# Patient Record
Sex: MALE | Race: BLACK OR AFRICAN AMERICAN | NOT HISPANIC OR LATINO | URBAN - METROPOLITAN AREA
[De-identification: names, ages, dates, MRNs, and addresses within clinical notes are randomized per-mention and may not be internally consistent; named-entity substitution may affect disease eponyms.]

---

## 2024-05-13 VITALS — HEIGHT: 69 IN | WEIGHT: 199.96 LBS

## 2024-05-13 RX ORDER — CHLORHEXIDINE GLUCONATE 213 G/1000ML
1 SOLUTION TOPICAL ONCE
Refills: 0 | Status: DISCONTINUED | OUTPATIENT
Start: 2024-05-15 | End: 2024-05-16

## 2024-05-13 NOTE — H&P ADULT - CRANIAL NERVE
Right-sided droop when smiling, able to stick tongue out, can raise eyebrows, unable to make puffy cheeks, can shrug shoulders

## 2024-05-13 NOTE — H&P ADULT - PSYCHIATRIC COMMENTS
Pt is A&O x 1-2 (knows his name, birth month but not day or year, knows today's calender date but not year and month, does not know the name of the hospital, thinks the president is Cueva).

## 2024-05-13 NOTE — H&P ADULT - NSICDXPASTMEDICALHX_GEN_ALL_CORE_FT
PAST MEDICAL HISTORY:  CVA (cerebrovascular accident)     DM (diabetes mellitus)     H/O carotid artery stenosis     Hyperlipidemia     Hypertension     PAD (peripheral artery disease)

## 2024-05-13 NOTE — H&P ADULT - NSHPLABSRESULTS_GEN_ALL_CORE
13.6   8.05  )-----------( 402      ( 15 May 2024 14:58 )             43.8       05-15    142  |  104  |  17  ----------------------------<  102<H>  4.3   |  25  |  1.17    Ca    10.0      15 May 2024 15:34  Mg     2.1     05-15    TPro  7.5  /  Alb  4.4  /  TBili  <0.2  /  DBili  x   /  AST  20  /  ALT  32  /  AlkPhos  127<H>  05-15      PT/INR - ( 15 May 2024 14:58 )   PT: 10.6 sec;   INR: 0.93          PTT - ( 15 May 2024 14:58 )  PTT:35.4 sec    CARDIAC MARKERS ( 15 May 2024 15:34 )  x     / x     / 136 U/L / x     / 4.9 ng/mL        Urinalysis Basic - ( 15 May 2024 15:34 )    Color: x / Appearance: x / SG: x / pH: x  Gluc: 102 mg/dL / Ketone: x  / Bili: x / Urobili: x   Blood: x / Protein: x / Nitrite: x   Leuk Esterase: x / RBC: x / WBC x   Sq Epi: x / Non Sq Epi: x / Bacteria: x

## 2024-05-13 NOTE — H&P ADULT - SOCIAL HISTORY: TOBACCO USE
Former smoker - quit 2023  Denies alcohol use or recreational drug use Smokes 1 cigarette per day (last occurrence 5/15/24)

## 2024-05-13 NOTE — H&P ADULT - HISTORY OF PRESENT ILLNESS
Cardiologist: Dr. Juanpablo Gibson  Escort:  Pharmacy:    62 yo M, former smoker - quit 2023. PMHx HTN, HLD, DM, severe PAD (DAVE 4/2024 per MD note with DAVE < 0.7 B/L, LE arterial US 3/2024 with severe R>L PAD - R leg with possible inflow inilac disease w/ additional SFA/pop/infrapop, L leg SFA/pop, plan to address after cardiac cath) , CVA 2016 (L MCA infarct affecting R-sided motor function,  walks with caner after stroke), carotid artery stenosis (L carotid artery occlusion,  s/p R endarterectomy)  who presented to o/p Cardiologist c/o non-radiating SSCP described as dull ache and of severe intensity with associated ACOSTA upon ambulation of >2 city blocks, alleviated with rest over past few months/years. Also with B/L thigh/calf/foot pain occurring at rest but worsened with exertion over past few months.  All other ROS negative.  MRI Brain 4/2023 per MD note:  large chronic infarct to left parietal/posterior temporal/occipital/posterior frontal lobes. NST 4/10/24:  small perfusion abnormality of mild intensity present in the anterior region on stress images which were reversible in rest images. TTE 3/14/24:  normal biventricular systolic function, inferior, inferoseptal hypokinesis, mild LVH, + LV diastolic dysfunction, trace MR/TR.  In light of pt's risk factors, CCS Anginal Class II sx, abnormal TTE/NST, patient referred for cardiac cath with possible intervention to r/o underlying CAD.  Cardiologist: Dr. Juanpablo Gibson  Escort:  Pharmacy:    64 yo M, former smoker - quit 2023. PMHx HTN, HLD, DM, severe PAD (DAVE 4/2024 per MD note with DAVE < 0.7 B/L, LE arterial US 3/2024 with severe R>L PAD - R leg with possible inflow inilac disease w/ additional SFA/pop/infrapop, L leg SFA/pop, plan to address after cardiac cath) , CVA 2016 (L MCA infarct affecting R-sided motor function,  walks with caner after stroke), carotid artery stenosis (L carotid artery occlusion,  s/p R endarterectomy)  who presented to o/p Cardiologist c/o non-radiating SSCP described as dull ache and of severe intensity with associated ACOSTA upon ambulation of >2 city blocks, alleviated with rest over past few months/years. Also with B/L thigh/calf/foot pain occurring at rest but worsened with exertion over past few months.  All other ROS negative.      MRI Brain 4/2023 per MD note:  large chronic infarct to left parietal/posterior temporal/occipital/posterior frontal lobes.     NST 4/10/24:  small perfusion abnormality of mild intensity present in the anterior region on stress images which were reversible in rest images.     TTE 3/14/24:  normal biventricular systolic function, inferior, inferoseptal hypokinesis, mild LVH, + LV diastolic dysfunction, trace MR/TR.      In light of pt's risk factors, CCS Anginal Class II sx, abnormal TTE/NST, patient referred for cardiac cath with possible intervention to r/o underlying CAD.  Cardiologist: Dr. Juanpablo Gibson  Pharmacy: [facility] ([phone])  Escort: health aide      62 yo M current smoker w/ PMHx of HTN, HLD, DMT2, severe PAD, CVA 2016 (L MCA infarct affecting R-sided motor function,  walks with cane after stroke), carotid artery stenosis (L carotid artery occlusion,  s/p R endarterectomy)  who presented to o/p Cardiologist c/o non-radiating SSCP described as dull ache and of severe intensity with associated ACOSTA upon ambulation of >2 city blocks, alleviated with rest over past few months/years. Also with B/L thigh/calf/foot pain occurring at rest but worsened with exertion over past few months.  Pt endorses back and RLE pain, RUE and RLE weakness. Denies palpitations, orthopnea, PND, syncope, dizziness, abdominal pain, fever, chills or recent sick contact.     Pt has memory deficits and slurred speech 2/2 the CVA. Pt is A&O x 1-2 (knows his name, birth month but not day or year, knows today's calender date but not year and month, does not know the name of the hospital, thinks the president is Cueva). Pt was unable to give consent due to memory deficits, wife gave verbal consent over the phone and verified medication.       DAVE 4/2024: per MD note with DAVE < 0.7 B/L, LE arterial US 3/2024 with severe R>L PAD - R leg with possible inflow inilac disease w/ additional SFA/pop/infrapop, L leg SFA/pop, plan to address after cardiac cath)    MRI Brain 4/2023 per MD note:  large chronic infarct to left parietal/posterior temporal/occipital/posterior frontal lobes.     NST 4/10/24:  small perfusion abnormality of mild intensity present in the anterior region on stress images which were reversible in rest images. LVEF 46%    TTE 3/14/24:  normal biventricular systolic function, inferior, inferoseptal hypokinesis, mild LVH, + LV diastolic dysfunction, trace MR/TR.      In light of pt's risk factors, CCS Anginal Class II sx, abnormal TTE/NST, patient referred for cardiac cath with possible intervention to r/o underlying CAD.  Cardiologist: Dr. Juanpablo Gibson  Pharmacy: [facility] ([phone])  Escort: health aide      64 yo M current smoker w/ PMHx of HTN, HLD, DMT2, severe PAD, CVA 2016 (L MCA infarct affecting R-sided motor function,  walks with cane after stroke, has memory deficits and slurred speech, A&O x 1), carotid artery stenosis (L carotid artery occlusion,  s/p R endarterectomy)  who presented to o/p Cardiologist c/o non-radiating SSCP described as dull ache and of severe intensity with associated ACOSTA upon ambulation of >2 city blocks, alleviated with rest over past few months/years. Also with B/L thigh/calf/foot pain occurring at rest but worsened with exertion over past few months.  Pt endorses back and RLE pain, RUE and RLE weakness. Denies palpitations, orthopnea, PND, syncope, dizziness, abdominal pain, fever, chills or recent sick contact.     DAVE 4/2024: per MD note with DAVE < 0.7 B/L, LE arterial US 3/2024 with severe R>L PAD - R leg with possible inflow inilac disease w/ additional SFA/pop/infrapop, L leg SFA/pop, plan to address after cardiac cath)    MRI Brain 4/2023 per MD note:  large chronic infarct to left parietal/posterior temporal/occipital/posterior frontal lobes.     NST 4/10/24:  small perfusion abnormality of mild intensity present in the anterior region on stress images which were reversible in rest images. LVEF 46%    TTE 3/14/24:  normal biventricular systolic function, inferior, inferoseptal hypokinesis, mild LVH, + LV diastolic dysfunction, trace MR/TR.      In light of pt's risk factors, CCS Anginal Class II sx, abnormal TTE/NST, patient referred for cardiac cath with possible intervention to r/o underlying CAD.  Cardiologist: Dr. Juanpablo Gibson  Pharmacy: [facility] ([phone])  Escort: health aide      64 yo M current smoker w/ PMHx of statin rhabdomyolysis (Crestor), HTN, HLD, DMT2, severe PAD, CVA 2016 (L MCA infarct affecting R-sided motor function,  walks with cane after stroke, has memory deficits and slurred speech, A&O x 1), carotid artery stenosis (L carotid artery occlusion,  s/p R endarterectomy)  who presented to o/p Cardiologist c/o non-radiating SSCP described as dull ache and of severe intensity with associated ACOSTA upon ambulation of >2 city blocks, alleviated with rest over past few months/years. Also with B/L thigh/calf/foot pain occurring at rest but worsened with exertion over past few months.  Pt endorses back and RLE pain, RUE and RLE weakness. Denies palpitations, orthopnea, PND, syncope, dizziness, abdominal pain, fever, chills or recent sick contact.     DAVE 4/2024: per MD note with DAVE < 0.7 B/L, LE arterial US 3/2024 with severe R>L PAD - R leg with possible inflow inilac disease w/ additional SFA/pop/infrapop, L leg SFA/pop, plan to address after cardiac cath)    MRI Brain 4/2023 per MD note:  large chronic infarct to left parietal/posterior temporal/occipital/posterior frontal lobes.     NST 4/10/24:  small perfusion abnormality of mild intensity present in the anterior region on stress images which were reversible in rest images. LVEF 46%    TTE 3/14/24:  normal biventricular systolic function, inferior, inferoseptal hypokinesis, mild LVH, + LV diastolic dysfunction, trace MR/TR.      In light of pt's risk factors, CCS Anginal Class II sx, abnormal TTE/NST, patient referred for cardiac cath with possible intervention to r/o underlying CAD.

## 2024-05-13 NOTE — H&P ADULT - ASSESSMENT
64 yo M current smoker w/ PMHx of HTN, HLD, DMT2, severe PAD, CVA 2016 (L MCA infarct affecting R-sided motor function,  walks with cane after stroke), carotid artery stenosis (L carotid artery occlusion,  s/p R endarterectomy)  whom in light of pt's risk factors, CCS Anginal Class II sx, abnormal TTE/NST, patient referred for cardiac cath with possible intervention to r/o underlying CAD.   		  ASA III	Mallampati class: III   Anginal Class: II    -No Known Allergies    -H/H = 13.6/43.8  -Pt and wife deny BRBPR, hematuria, hematochezia, melena. Pt's wife endorses compliance w/ home aspirin 81 mg, stating last dose was 5/14/24 and Plavix 75 mg, stating last dose was 5/14. Pt loaded w/ ASA 81 mg x1 and Plavix 75 mg x1 in holding w/ home medication per Dr. Gibson.    -Cr = 1.17   -EF 46% on NST 4/10/24 . Euvolemic on exam. IV NS @ 250 bolus followed by 75 cc/hr x 2 hrs started pre procedure    Sedation Plan:   Moderate  Patient Is Suitable Candidate For Sedation?     Yes    Risks & benefits of procedure and alternative therapy have been explained to the patient by the Interventional Cardiology Fellow including but not limited to: allergic reaction, bleeding with possible need for blood transfusion, infection, renal and vascular compromise, limb damage, arrhythmia, stroke, vessel dissection/perforation, myocardial infarction, and emergent CABG. Informed consent obtained at bedside and included in chart. 64 yo M current smoker w/ PMHx of HTN, HLD, DMT2, severe PAD, CVA 2016 (L MCA infarct affecting R-sided motor function,  walks with cane after stroke), carotid artery stenosis (L carotid artery occlusion,  s/p R endarterectomy)  whom in light of pt's risk factors, CCS Anginal Class II sx, abnormal TTE/NST, patient referred for cardiac cath with possible intervention to r/o underlying CAD.   		  ASA III	Mallampati class: III   Anginal Class: II    -No Known Allergies    -H/H = 13.6/43.8  -Pt and wife deny BRBPR, hematuria, hematochezia, melena. Pt's wife endorses compliance w/ home aspirin 81 mg, stating last dose was 5/14/24 and Plavix 75 mg, stating last dose was 5/14. Pt loaded w/ ASA 81 mg x1 and Plavix 75 mg x1 in holding w/ home medication per Dr. Gibson.    -Cr = 1.17   -EF 46% on NST 4/10/24 . Euvolemic on exam. IV NS @ 250 bolus followed by 75 cc/hr x 2 hrs started pre procedure    -Memory Deficits   --Pt is A&O x 1 (knows his name, birth month but not day or year, knows today's calender date but not year and month, does not know the name of the hospital, thinks the president is Cueva). Pt was unable to give consent due to memory deficits, wife gave verbal consent over the phone and verified medication.     Sedation Plan:   Moderate  Patient Is Suitable Candidate For Sedation?     Yes    Risks & benefits of procedure and alternative therapy have been explained to the patient by the Interventional Cardiology Fellow including but not limited to: allergic reaction, bleeding with possible need for blood transfusion, infection, renal and vascular compromise, limb damage, arrhythmia, stroke, vessel dissection/perforation, myocardial infarction, and emergent CABG. Informed consent obtained at bedside and included in chart.

## 2024-05-15 ENCOUNTER — INPATIENT (INPATIENT)
Facility: HOSPITAL | Age: 64
LOS: 0 days | Discharge: HOME | DRG: 322 | End: 2024-05-16
Attending: INTERNAL MEDICINE | Admitting: INTERNAL MEDICINE
Payer: MEDICARE

## 2024-05-15 DIAGNOSIS — Z98.890 OTHER SPECIFIED POSTPROCEDURAL STATES: Chronic | ICD-10-CM

## 2024-05-15 LAB
A1C WITH ESTIMATED AVERAGE GLUCOSE RESULT: 6.4 % — HIGH (ref 4–5.6)
ALBUMIN SERPL ELPH-MCNC: 4.4 G/DL — SIGNIFICANT CHANGE UP (ref 3.3–5)
ALP SERPL-CCNC: 127 U/L — HIGH (ref 40–120)
ALT FLD-CCNC: 32 U/L — SIGNIFICANT CHANGE UP (ref 10–45)
ANION GAP SERPL CALC-SCNC: 13 MMOL/L — SIGNIFICANT CHANGE UP (ref 5–17)
APTT BLD: 35.4 SEC — SIGNIFICANT CHANGE UP (ref 24.5–35.6)
AST SERPL-CCNC: 20 U/L — SIGNIFICANT CHANGE UP (ref 10–40)
BASOPHILS # BLD AUTO: 0.03 K/UL — SIGNIFICANT CHANGE UP (ref 0–0.2)
BASOPHILS NFR BLD AUTO: 0.4 % — SIGNIFICANT CHANGE UP (ref 0–2)
BILIRUB SERPL-MCNC: <0.2 MG/DL — SIGNIFICANT CHANGE UP (ref 0.2–1.2)
BUN SERPL-MCNC: 17 MG/DL — SIGNIFICANT CHANGE UP (ref 7–23)
CALCIUM SERPL-MCNC: 10 MG/DL — SIGNIFICANT CHANGE UP (ref 8.4–10.5)
CHLORIDE SERPL-SCNC: 104 MMOL/L — SIGNIFICANT CHANGE UP (ref 96–108)
CHOLEST SERPL-MCNC: 269 MG/DL — HIGH
CK MB CFR SERPL CALC: 4.9 NG/ML — SIGNIFICANT CHANGE UP (ref 0–6.7)
CK SERPL-CCNC: 136 U/L — SIGNIFICANT CHANGE UP (ref 30–200)
CO2 SERPL-SCNC: 25 MMOL/L — SIGNIFICANT CHANGE UP (ref 22–31)
CREAT SERPL-MCNC: 1.17 MG/DL — SIGNIFICANT CHANGE UP (ref 0.5–1.3)
EGFR: 70 ML/MIN/1.73M2 — SIGNIFICANT CHANGE UP
EOSINOPHIL # BLD AUTO: 0.3 K/UL — SIGNIFICANT CHANGE UP (ref 0–0.5)
EOSINOPHIL NFR BLD AUTO: 3.7 % — SIGNIFICANT CHANGE UP (ref 0–6)
ESTIMATED AVERAGE GLUCOSE: 137 MG/DL — HIGH (ref 68–114)
GLUCOSE BLDC GLUCOMTR-MCNC: 107 MG/DL — HIGH (ref 70–99)
GLUCOSE BLDC GLUCOMTR-MCNC: 127 MG/DL — HIGH (ref 70–99)
GLUCOSE BLDC GLUCOMTR-MCNC: 95 MG/DL — SIGNIFICANT CHANGE UP (ref 70–99)
GLUCOSE SERPL-MCNC: 102 MG/DL — HIGH (ref 70–99)
HCT VFR BLD CALC: 43.8 % — SIGNIFICANT CHANGE UP (ref 39–50)
HDLC SERPL-MCNC: 33 MG/DL — LOW
HGB BLD-MCNC: 13.6 G/DL — SIGNIFICANT CHANGE UP (ref 13–17)
IMM GRANULOCYTES NFR BLD AUTO: 0.4 % — SIGNIFICANT CHANGE UP (ref 0–0.9)
INR BLD: 0.93 — SIGNIFICANT CHANGE UP (ref 0.85–1.18)
LIPID PNL WITH DIRECT LDL SERPL: 187 MG/DL — HIGH
LYMPHOCYTES # BLD AUTO: 2.61 K/UL — SIGNIFICANT CHANGE UP (ref 1–3.3)
LYMPHOCYTES # BLD AUTO: 32.4 % — SIGNIFICANT CHANGE UP (ref 13–44)
MAGNESIUM SERPL-MCNC: 2.1 MG/DL — SIGNIFICANT CHANGE UP (ref 1.6–2.6)
MCHC RBC-ENTMCNC: 27.9 PG — SIGNIFICANT CHANGE UP (ref 27–34)
MCHC RBC-ENTMCNC: 31.1 GM/DL — LOW (ref 32–36)
MCV RBC AUTO: 89.8 FL — SIGNIFICANT CHANGE UP (ref 80–100)
MONOCYTES # BLD AUTO: 0.71 K/UL — SIGNIFICANT CHANGE UP (ref 0–0.9)
MONOCYTES NFR BLD AUTO: 8.8 % — SIGNIFICANT CHANGE UP (ref 2–14)
NEUTROPHILS # BLD AUTO: 4.37 K/UL — SIGNIFICANT CHANGE UP (ref 1.8–7.4)
NEUTROPHILS NFR BLD AUTO: 54.3 % — SIGNIFICANT CHANGE UP (ref 43–77)
NON HDL CHOLESTEROL: 236 MG/DL — HIGH
NRBC # BLD: 0 /100 WBCS — SIGNIFICANT CHANGE UP (ref 0–0)
PLATELET # BLD AUTO: 402 K/UL — HIGH (ref 150–400)
POTASSIUM SERPL-MCNC: 4.3 MMOL/L — SIGNIFICANT CHANGE UP (ref 3.5–5.3)
POTASSIUM SERPL-SCNC: 4.3 MMOL/L — SIGNIFICANT CHANGE UP (ref 3.5–5.3)
PROT SERPL-MCNC: 7.5 G/DL — SIGNIFICANT CHANGE UP (ref 6–8.3)
PROTHROM AB SERPL-ACNC: 10.6 SEC — SIGNIFICANT CHANGE UP (ref 9.5–13)
RBC # BLD: 4.88 M/UL — SIGNIFICANT CHANGE UP (ref 4.2–5.8)
RBC # FLD: 15.5 % — HIGH (ref 10.3–14.5)
SODIUM SERPL-SCNC: 142 MMOL/L — SIGNIFICANT CHANGE UP (ref 135–145)
TRIGL SERPL-MCNC: 253 MG/DL — HIGH
WBC # BLD: 8.05 K/UL — SIGNIFICANT CHANGE UP (ref 3.8–10.5)
WBC # FLD AUTO: 8.05 K/UL — SIGNIFICANT CHANGE UP (ref 3.8–10.5)

## 2024-05-15 PROCEDURE — 92978 ENDOLUMINL IVUS OCT C 1ST: CPT | Mod: 26,RC

## 2024-05-15 PROCEDURE — 99152 MOD SED SAME PHYS/QHP 5/>YRS: CPT

## 2024-05-15 PROCEDURE — 93458 L HRT ARTERY/VENTRICLE ANGIO: CPT | Mod: 26,59

## 2024-05-15 PROCEDURE — 92928 PRQ TCAT PLMT NTRAC ST 1 LES: CPT | Mod: RC

## 2024-05-15 RX ORDER — AMLODIPINE BESYLATE 2.5 MG/1
10 TABLET ORAL DAILY
Refills: 0 | Status: DISCONTINUED | OUTPATIENT
Start: 2024-05-15 | End: 2024-05-16

## 2024-05-15 RX ORDER — SODIUM CHLORIDE 9 MG/ML
1000 INJECTION, SOLUTION INTRAVENOUS
Refills: 0 | Status: DISCONTINUED | OUTPATIENT
Start: 2024-05-15 | End: 2024-05-16

## 2024-05-15 RX ORDER — SODIUM CHLORIDE 9 MG/ML
1000 INJECTION INTRAMUSCULAR; INTRAVENOUS; SUBCUTANEOUS
Refills: 0 | Status: DISCONTINUED | OUTPATIENT
Start: 2024-05-15 | End: 2024-05-16

## 2024-05-15 RX ORDER — ASPIRIN/CALCIUM CARB/MAGNESIUM 324 MG
81 TABLET ORAL DAILY
Refills: 0 | Status: DISCONTINUED | OUTPATIENT
Start: 2024-05-15 | End: 2024-05-16

## 2024-05-15 RX ORDER — DEXTROSE 50 % IN WATER 50 %
15 SYRINGE (ML) INTRAVENOUS ONCE
Refills: 0 | Status: DISCONTINUED | OUTPATIENT
Start: 2024-05-15 | End: 2024-05-16

## 2024-05-15 RX ORDER — DOCUSATE SODIUM 100 MG
1 CAPSULE ORAL
Refills: 0 | DISCHARGE

## 2024-05-15 RX ORDER — NORTRIPTYLINE HYDROCHLORIDE 10 MG/1
1 CAPSULE ORAL
Refills: 0 | DISCHARGE

## 2024-05-15 RX ORDER — DEXTROSE 10 % IN WATER 10 %
125 INTRAVENOUS SOLUTION INTRAVENOUS ONCE
Refills: 0 | Status: DISCONTINUED | OUTPATIENT
Start: 2024-05-15 | End: 2024-05-16

## 2024-05-15 RX ORDER — CHOLECALCIFEROL (VITAMIN D3) 125 MCG
1 CAPSULE ORAL
Refills: 0 | DISCHARGE

## 2024-05-15 RX ORDER — OMEGA-3 ACID ETHYL ESTERS 1 G
1 CAPSULE ORAL
Refills: 0 | DISCHARGE

## 2024-05-15 RX ORDER — EVOLOCUMAB 140 MG/ML
140 INJECTION, SOLUTION SUBCUTANEOUS
Refills: 0 | DISCHARGE

## 2024-05-15 RX ORDER — DICLOFENAC SODIUM 30 MG/G
40 GEL TOPICAL
Refills: 0 | DISCHARGE

## 2024-05-15 RX ORDER — GABAPENTIN 400 MG/1
1 CAPSULE ORAL
Refills: 0 | DISCHARGE

## 2024-05-15 RX ORDER — LOSARTAN POTASSIUM 100 MG/1
100 TABLET, FILM COATED ORAL DAILY
Refills: 0 | Status: DISCONTINUED | OUTPATIENT
Start: 2024-05-15 | End: 2024-05-16

## 2024-05-15 RX ORDER — SODIUM CHLORIDE 9 MG/ML
1000 INJECTION INTRAMUSCULAR; INTRAVENOUS; SUBCUTANEOUS
Refills: 0 | Status: COMPLETED | OUTPATIENT
Start: 2024-05-15 | End: 2024-05-15

## 2024-05-15 RX ORDER — LOSARTAN/HYDROCHLOROTHIAZIDE 100MG-25MG
1 TABLET ORAL
Refills: 0 | DISCHARGE

## 2024-05-15 RX ORDER — ATORVASTATIN CALCIUM 80 MG/1
40 TABLET, FILM COATED ORAL AT BEDTIME
Refills: 0 | Status: DISCONTINUED | OUTPATIENT
Start: 2024-05-15 | End: 2024-05-15

## 2024-05-15 RX ORDER — SODIUM CHLORIDE 9 MG/ML
250 INJECTION INTRAMUSCULAR; INTRAVENOUS; SUBCUTANEOUS ONCE
Refills: 0 | Status: COMPLETED | OUTPATIENT
Start: 2024-05-15 | End: 2024-05-15

## 2024-05-15 RX ORDER — DEXTROSE 50 % IN WATER 50 %
25 SYRINGE (ML) INTRAVENOUS ONCE
Refills: 0 | Status: DISCONTINUED | OUTPATIENT
Start: 2024-05-15 | End: 2024-05-16

## 2024-05-15 RX ORDER — NORTRIPTYLINE HYDROCHLORIDE 10 MG/1
50 CAPSULE ORAL DAILY
Refills: 0 | Status: DISCONTINUED | OUTPATIENT
Start: 2024-05-15 | End: 2024-05-16

## 2024-05-15 RX ORDER — AMLODIPINE BESYLATE 2.5 MG/1
1 TABLET ORAL
Refills: 0 | DISCHARGE

## 2024-05-15 RX ORDER — ASPIRIN/CALCIUM CARB/MAGNESIUM 324 MG
81 TABLET ORAL ONCE
Refills: 0 | Status: DISCONTINUED | OUTPATIENT
Start: 2024-05-15 | End: 2024-05-15

## 2024-05-15 RX ORDER — GLUCAGON INJECTION, SOLUTION 0.5 MG/.1ML
1 INJECTION, SOLUTION SUBCUTANEOUS ONCE
Refills: 0 | Status: DISCONTINUED | OUTPATIENT
Start: 2024-05-15 | End: 2024-05-16

## 2024-05-15 RX ORDER — CLOPIDOGREL BISULFATE 75 MG/1
75 TABLET, FILM COATED ORAL ONCE
Refills: 0 | Status: COMPLETED | OUTPATIENT
Start: 2024-05-15 | End: 2024-05-16

## 2024-05-15 RX ORDER — DEXTROSE 50 % IN WATER 50 %
12.5 SYRINGE (ML) INTRAVENOUS ONCE
Refills: 0 | Status: DISCONTINUED | OUTPATIENT
Start: 2024-05-15 | End: 2024-05-16

## 2024-05-15 RX ORDER — GABAPENTIN 400 MG/1
600 CAPSULE ORAL DAILY
Refills: 0 | Status: DISCONTINUED | OUTPATIENT
Start: 2024-05-15 | End: 2024-05-16

## 2024-05-15 RX ORDER — INSULIN LISPRO 100/ML
VIAL (ML) SUBCUTANEOUS
Refills: 0 | Status: DISCONTINUED | OUTPATIENT
Start: 2024-05-15 | End: 2024-05-16

## 2024-05-15 RX ORDER — ASPIRIN/CALCIUM CARB/MAGNESIUM 324 MG
325 TABLET ORAL ONCE
Refills: 0 | Status: DISCONTINUED | OUTPATIENT
Start: 2024-05-15 | End: 2024-05-15

## 2024-05-15 RX ADMIN — SODIUM CHLORIDE 75 MILLILITER(S): 9 INJECTION INTRAMUSCULAR; INTRAVENOUS; SUBCUTANEOUS at 18:06

## 2024-05-15 RX ADMIN — SODIUM CHLORIDE 240 MILLILITER(S): 9 INJECTION INTRAMUSCULAR; INTRAVENOUS; SUBCUTANEOUS at 18:04

## 2024-05-15 RX ADMIN — NORTRIPTYLINE HYDROCHLORIDE 50 MILLIGRAM(S): 10 CAPSULE ORAL at 22:50

## 2024-05-15 RX ADMIN — SODIUM CHLORIDE 500 MILLILITER(S): 9 INJECTION INTRAMUSCULAR; INTRAVENOUS; SUBCUTANEOUS at 17:05

## 2024-05-15 NOTE — DISCHARGE NOTE PROVIDER - NSDCCPCAREPLAN_GEN_ALL_CORE_FT
PRINCIPAL DISCHARGE DIAGNOSIS  Diagnosis: CAD (coronary artery disease)  Assessment and Plan of Treatment: You were found to have blockages in the arteries of your heart, also known as Coronary Artery Disease. You underwent a cardiac catheterization on 5/15/24 and received two stents to the right coronary artery.  PLEASE CONTINUE ASPIRIN 81MG DAILY AND PLAVIX 75MG DAILY. DO NOT STOP THESE MEDICATIONS FOR ANY REASON AS THEY ARE KEEPING YOUR STENT OPEN AND PREVENTING A HEART ATTACK.   Avoid strenuous activity or heavy lifting anything more than 5lbs for the next five days. Do not take a bath or swim for the next five days; you may shower. For any bleeding or hematoma formation (hardened blood collection under the skin) at the access site of your right wrist  please hold pressure and go to the emergency room. Please follow up with Dr. Gibson in 1-2 weeks. For recurrent chest pain, please call your doctor or go to the emergency room.  We have provided you with a prescription for cardiac rehab which is medically supervised exercise program for your heart and has been shown to improve the quantity and quality of life of people with heart disease like yours. You should attend cardiac rehab 3 times per week for 12 weeks. We have provided you with a list of nearby facilities. Please call your insurance carrier to determine which of these facilities are covered under your plan.      SECONDARY DISCHARGE DIAGNOSES  Diagnosis: HTN (hypertension)  Assessment and Plan of Treatment: Please continue Amlodipine 5mg and Losartan-Hydrochlorothiazide as listed to keep your blood pressure controlled. For blood pressure that is too high or too low please see your doctor or go to the emergency room as necessary.      Diagnosis: HLD (hyperlipidemia)  Assessment and Plan of Treatment: Please continue Repatha injections as prescribed to keep your cholesterol low. High cholesterol contributes to heart disease.      Diagnosis: DM (diabetes mellitus)  Assessment and Plan of Treatment: Your Hemoglobin A1c is 6.4 and your goal A1c is less than 7.0%. This number measures your average blood sugar level over the last three months.  Please continue Metformin as listed for diabetes. Please  restart Metformin on Rm May 19th.  Maintain a low carbohydrate, low sugar diet, exercise, monitor your fingerstick blood sugars regarly and follow up with your Endocrinologist/Primary Care Doctor.

## 2024-05-15 NOTE — DISCHARGE NOTE PROVIDER - CARE PROVIDER_API CALL
Juanpablo Gibson  Interventional Cardiology  [address] [place] [address], [address]  [place] [address]  Phone: [phone]  Fax: [phone]  Established Patient  Follow Up Time: 2 weeks

## 2024-05-15 NOTE — DISCHARGE NOTE PROVIDER - NSDCMRMEDTOKEN_GEN_ALL_CORE_FT
amLODIPine 10 mg oral tablet: 1 tab(s) orally once a day  Aspirin EC 81 mg oral delayed release tablet: 1 tab(s) orally once a day  bisacodyl 5 mg oral tablet: 1 tab(s) orally once a day as needed for  constipation  cholecalciferol 25 mcg (1000 intl units) oral tablet: 1 tab(s) orally once a day  Colace 100 mg oral capsule: 1 cap(s) orally 3 times a day as needed for  constipation  gabapentin 600 mg oral tablet: 1 tab(s) orally once a day  losartan-hydroCHLOROthiazide 100 mg-12.5 mg oral tablet: 1 tab(s) orally once a day  metFORMIN 750 mg oral tablet, extended release: 1 tab(s) orally once a day  nortriptyline 50 mg oral capsule: 1 cap(s) orally once a day  Omega-3 Fish Oil 1000 mg oral capsule: 1 cap(s) orally 2 times a day  Plavix 75 mg oral tablet: 1 tab(s) orally once a day  Repatha 140 mg/mL subcutaneous solution: 140 milligram(s) subcutaneously every 2 weeks   amLODIPine 10 mg oral tablet: 1 tab(s) orally once a day  Aspirin EC 81 mg oral delayed release tablet: 1 tab(s) orally once a day  bisacodyl 5 mg oral tablet: 1 tab(s) orally once a day as needed for  constipation  cardiac rehab, 3x/week x 12 weeks for diagnosis of CAD. Cardiologist Dr. Gibson: cardiac rehab, 3x/week x 12 weeks for diagnosis of CAD. Cardiologist Dr. Gibson  cholecalciferol 25 mcg (1000 intl units) oral tablet: 1 tab(s) orally once a day  Colace 100 mg oral capsule: 1 cap(s) orally 3 times a day as needed for  constipation  gabapentin 600 mg oral tablet: 1 tab(s) orally once a day  losartan-hydroCHLOROthiazide 100 mg-12.5 mg oral tablet: 1 tab(s) orally once a day  metFORMIN 750 mg oral tablet, extended release: 1 tab(s) orally once a day Please resume Metformin on 5/19/24  nortriptyline 50 mg oral capsule: 1 cap(s) orally once a day  Omega-3 Fish Oil 1000 mg oral capsule: 1 cap(s) orally 2 times a day  Plavix 75 mg oral tablet: 1 tab(s) orally once a day  Repatha 140 mg/mL subcutaneous solution: 140 milligram(s) subcutaneously every 2 weeks

## 2024-05-15 NOTE — DISCHARGE NOTE PROVIDER - ATTENDING DISCHARGE PHYSICAL EXAMINATION:
62 yo M current smoker w/ PMHx of HTN, HLD, DMT2, severe PAD, CVA 2016 (L MCA infarct affecting R-sided motor function,  walks with cane after stroke, has memory deficits and slurred speech, A&O x 1), carotid artery stenosis (L carotid artery occlusion,  s/p R endarterectomy)  who presented to o/p Cardiologist c/o non-radiating SSCP described as dull ache and of severe intensity with associated ACOSTA upon ambulation of >2 city blocks, alleviated with rest over past few months/years.     1. CAD  S/p [facility] 5/15/24: pRCA 90% s/p WARNER x 1, mRCA 70% s/p WARNER x 1, LM MLI, dLCx 20% mdd, LAD mli, EDP 8, Access - RTR   Follow-up with: Dr. Gibson. Patient prescribed statin: NONE 2/2 INDUCED RHABDO (on Repatha). Patient also prescribed DAPT: ASA/PLavix.

## 2024-05-15 NOTE — DISCHARGE NOTE PROVIDER - NSDCFUADDINST_GEN_ALL_CORE_FT
ACTIVITY:     Do not drive or operate hazardous machinery for 24 hours.                           Limit your physical activities for 24 hours.                           Do not engage in in sports, heavy work or heavy lifting for 72 hours.           DIET:             You may resume your regular diet.                           Drinking extra fluids (water, juice) is encouraged.                           Abstain from alcohol for 24 hours.           HYGIENE:     Shower and take off the puncture site dressing the next morning.                           If you received a "closure device" in your groin, you may shower the next day, but not take a bath, hot tub or swim for 5  days.           PAIN MEDICATION:   A mild pain at the puncture site is not unusual.                                           You may take Tylenol 1-2 tabs every 4-6 hours as needed, for one day.                                           If the pain persists contact the office at [phone]           SPECIAL INSTRUCTIONS:     Signs and symptoms to look out for:              1. Yusef bleeding from the puncture site is an emergency.               Put direct pressure on the site and go directly to your local Emergency   Room for treatment.              2. Bleeding under the skin may also occur and a small "black and blue may be expected.            If there appears to be an expanding mass or swelling around the puncture site, apply manual compression and go             immediately to your local Emergency Room for treatment.              3. If your foot/leg or hand/arm (puncture site) becomes cool or blue and/or you are unable to move it, this must be treated as an   emergency.            go directly to your local emergency room for treatment.              4. Excessive puncture site pain is abnormal and should be assessed.             5.  Look out for signs of infection in the puncture site: fever, red streaking of the leg, discharge, pain.             6.  Lack of adequate urine output, provided you are drinking enough fluids, may be cause for concern, notify us if you think this is the case.

## 2024-05-15 NOTE — DISCHARGE NOTE PROVIDER - HOSPITAL COURSE
62 yo M current smoker w/ PMHx of HTN, HLD, DMT2, severe PAD, CVA 2016 (L MCA infarct affecting R-sided motor function,  walks with cane after stroke, has memory deficits and slurred speech, A&O x 1), carotid artery stenosis (L carotid artery occlusion,  s/p R endarterectomy)  who presented to o/p Cardiologist c/o non-radiating SSCP described as dull ache and of severe intensity with associated ACOSTA upon ambulation of >2 city blocks, alleviated with rest over past few months/years. Also with B/L thigh/calf/foot pain occurring at rest but worsened with exertion over past few months.  Pt endorses back and RLE pain, RUE and RLE weakness. Denies palpitations, orthopnea, PND, syncope, dizziness, abdominal pain, fever, chills or recent sick contact.     DAVE 4/2024: per MD note with DAVE < 0.7 B/L, LE arterial US 3/2024 with severe R>L PAD - R leg with possible inflow inilac disease w/ additional SFA/pop/infrapop, L leg SFA/pop, plan to address after cardiac cath)    MRI Brain 4/2023 per MD note:  large chronic infarct to left parietal/posterior temporal/occipital/posterior frontal lobes.     NST 4/10/24:  small perfusion abnormality of mild intensity present in the anterior region on stress images which were reversible in rest images. LVEF 46%    TTE 3/14/24:  normal biventricular systolic function, inferior, inferoseptal hypokinesis, mild LVH, + LV diastolic dysfunction, trace MR/TR.     Patient is now s/p LHC 5/15/24: pRCA 90% s/p WARNER x 1, mRCA 70% s/p AWRNER x 1, LM MLI, dLCx 20% mdd, LAD mli, EDP 8, Access - RTR     GLP-1 receptor agonist/SGLT2 inhibitor meds discussed w/ patients and encouraged to discuss further with PMD or Endocrinologist at next visit.      Pt discharge copies detail cardiovascular history, medications, testing/treatments, OR patient has created a patient portal account and instructed to provide their records at their 1st appointment.    Dx: POST PCI. Patient to follow-up with: Dr. Gibson. Patient been educated on benefits of cardiac rehab. Patient given referral and rx for cardiac rehab with a list of locations and instructions to contact their insurance company to review list of participating providers. Patient prescribed statin: NONE 2/2 INDUCED RHABDO (on Repatha). Patient also prescribed DAPT: ASA/PLavix. Sent to patient's pharmacy with refills.     Pt is asymptomatic at this time and denies chest pain, SOB, ACOSTA, palpitations, dizziness, LOC, N/V, diaphoresis, orthopnea/PND, and leg swelling. Pt able to ambulate and void without complication. VSS. Labs and telemetry reviewed. Right radial access site stable and dressing C/D/I.  Pt is a candidate for discharge per Dr. Nuñez. Pt given appropriate discharge instructions, pt states they have an appropriate amount of their previous home meds unchanged from this visit at home, and any new medications were sent to their pharmacy. Pt instructed to f/u with Dr. Gibson in 1-2 weeks.    DISCHARGE MEDS:   amLODIPine 10 mg oral tablet: 1 tab(s) orally once a day  Aspirin EC 81 mg oral delayed release tablet: 1 tab(s) orally once a day  bisacodyl 5 mg oral tablet: 1 tab(s) orally once a day as needed for  constipation  cholecalciferol 25 mcg (1000 intl units) oral tablet: 1 tab(s) orally once a day  Colace 100 mg oral capsule: 1 cap(s) orally 3 times a day as needed for  constipation  gabapentin 600 mg oral tablet: 1 tab(s) orally once a day  losartan-hydroCHLOROthiazide 100 mg-12.5 mg oral tablet: 1 tab(s) orally once a day  metFORMIN 750 mg oral tablet, extended release: 1 tab(s) orally once a day  nortriptyline 50 mg oral capsule: 1 cap(s) orally once a day  Omega-3 Fish Oil 1000 mg oral capsule: 1 cap(s) orally 2 times a day  Plavix 75 mg oral tablet: 1 tab(s) orally once a day  Repatha 140 mg/mL subcutaneous solution: 140 milligram(s) subcutaneously every 2 weeks

## 2024-05-15 NOTE — PATIENT PROFILE ADULT - FALL HARM RISK - HARM RISK INTERVENTIONS
Assistance with ambulation/Assistance OOB with selected safe patient handling equipment/Communicate Risk of Fall with Harm to all staff/Discuss with provider need for PT consult/Monitor gait and stability/Provide patient with walking aids - walker, cane, crutches/Reinforce activity limits and safety measures with patient and family/Sit up slowly, dangle for a short time, stand at bedside before walking/Tailored Fall Risk Interventions/Use of alarms - bed, chair and/or voice tab/Visual Cue: Yellow wristband and red socks/Bed in lowest position, wheels locked, appropriate side rails in place/Call bell, personal items and telephone in reach/Instruct patient to call for assistance before getting out of bed or chair/Non-slip footwear when patient is out of bed/Falls Of Rough to call system/Physically safe environment - no spills, clutter or unnecessary equipment/Purposeful Proactive Rounding/Room/bathroom lighting operational, light cord in reach

## 2024-05-16 VITALS
RESPIRATION RATE: 18 BRPM | DIASTOLIC BLOOD PRESSURE: 69 MMHG | HEART RATE: 86 BPM | SYSTOLIC BLOOD PRESSURE: 120 MMHG | OXYGEN SATURATION: 95 %

## 2024-05-16 LAB
ANION GAP SERPL CALC-SCNC: 13 MMOL/L — SIGNIFICANT CHANGE UP (ref 5–17)
BUN SERPL-MCNC: 16 MG/DL — SIGNIFICANT CHANGE UP (ref 7–23)
CALCIUM SERPL-MCNC: 9.4 MG/DL — SIGNIFICANT CHANGE UP (ref 8.4–10.5)
CHLORIDE SERPL-SCNC: 103 MMOL/L — SIGNIFICANT CHANGE UP (ref 96–108)
CO2 SERPL-SCNC: 23 MMOL/L — SIGNIFICANT CHANGE UP (ref 22–31)
CREAT SERPL-MCNC: 1.05 MG/DL — SIGNIFICANT CHANGE UP (ref 0.5–1.3)
EGFR: 80 ML/MIN/1.73M2 — SIGNIFICANT CHANGE UP
GLUCOSE BLDC GLUCOMTR-MCNC: 119 MG/DL — HIGH (ref 70–99)
GLUCOSE BLDC GLUCOMTR-MCNC: 132 MG/DL — HIGH (ref 70–99)
GLUCOSE SERPL-MCNC: 111 MG/DL — HIGH (ref 70–99)
HCT VFR BLD CALC: 40.2 % — SIGNIFICANT CHANGE UP (ref 39–50)
HGB BLD-MCNC: 12.6 G/DL — LOW (ref 13–17)
MAGNESIUM SERPL-MCNC: 2.1 MG/DL — SIGNIFICANT CHANGE UP (ref 1.6–2.6)
MCHC RBC-ENTMCNC: 27.9 PG — SIGNIFICANT CHANGE UP (ref 27–34)
MCHC RBC-ENTMCNC: 31.3 GM/DL — LOW (ref 32–36)
MCV RBC AUTO: 88.9 FL — SIGNIFICANT CHANGE UP (ref 80–100)
NRBC # BLD: 0 /100 WBCS — SIGNIFICANT CHANGE UP (ref 0–0)
PLATELET # BLD AUTO: 368 K/UL — SIGNIFICANT CHANGE UP (ref 150–400)
POTASSIUM SERPL-MCNC: 4 MMOL/L — SIGNIFICANT CHANGE UP (ref 3.5–5.3)
POTASSIUM SERPL-SCNC: 4 MMOL/L — SIGNIFICANT CHANGE UP (ref 3.5–5.3)
RBC # BLD: 4.52 M/UL — SIGNIFICANT CHANGE UP (ref 4.2–5.8)
RBC # FLD: 15.8 % — HIGH (ref 10.3–14.5)
SODIUM SERPL-SCNC: 139 MMOL/L — SIGNIFICANT CHANGE UP (ref 135–145)
WBC # BLD: 6.97 K/UL — SIGNIFICANT CHANGE UP (ref 3.8–10.5)
WBC # FLD AUTO: 6.97 K/UL — SIGNIFICANT CHANGE UP (ref 3.8–10.5)

## 2024-05-16 PROCEDURE — 82962 GLUCOSE BLOOD TEST: CPT

## 2024-05-16 PROCEDURE — 36415 COLL VENOUS BLD VENIPUNCTURE: CPT

## 2024-05-16 PROCEDURE — 80053 COMPREHEN METABOLIC PANEL: CPT

## 2024-05-16 PROCEDURE — 82553 CREATINE MB FRACTION: CPT

## 2024-05-16 PROCEDURE — 93010 ELECTROCARDIOGRAM REPORT: CPT | Mod: 1L

## 2024-05-16 PROCEDURE — C1753: CPT

## 2024-05-16 PROCEDURE — 99239 HOSP IP/OBS DSCHRG MGMT >30: CPT

## 2024-05-16 PROCEDURE — 85027 COMPLETE CBC AUTOMATED: CPT

## 2024-05-16 PROCEDURE — C1725: CPT

## 2024-05-16 PROCEDURE — 80048 BASIC METABOLIC PNL TOTAL CA: CPT

## 2024-05-16 PROCEDURE — 83036 HEMOGLOBIN GLYCOSYLATED A1C: CPT

## 2024-05-16 PROCEDURE — 85730 THROMBOPLASTIN TIME PARTIAL: CPT

## 2024-05-16 PROCEDURE — 85347 COAGULATION TIME ACTIVATED: CPT

## 2024-05-16 PROCEDURE — C1887: CPT

## 2024-05-16 PROCEDURE — C1874: CPT

## 2024-05-16 PROCEDURE — 82550 ASSAY OF CK (CPK): CPT

## 2024-05-16 PROCEDURE — 85610 PROTHROMBIN TIME: CPT

## 2024-05-16 PROCEDURE — 93005 ELECTROCARDIOGRAM TRACING: CPT

## 2024-05-16 PROCEDURE — C1894: CPT

## 2024-05-16 PROCEDURE — 85025 COMPLETE CBC W/AUTO DIFF WBC: CPT

## 2024-05-16 PROCEDURE — C1769: CPT

## 2024-05-16 PROCEDURE — 83735 ASSAY OF MAGNESIUM: CPT

## 2024-05-16 PROCEDURE — 80061 LIPID PANEL: CPT

## 2024-05-16 RX ORDER — CLOPIDOGREL BISULFATE 75 MG/1
1 TABLET, FILM COATED ORAL
Refills: 0 | DISCHARGE

## 2024-05-16 RX ORDER — CLOPIDOGREL BISULFATE 75 MG/1
1 TABLET, FILM COATED ORAL
Qty: 30 | Refills: 11
Start: 2024-05-16 | End: 2025-05-10

## 2024-05-16 RX ORDER — METFORMIN HYDROCHLORIDE 850 MG/1
1 TABLET ORAL
Qty: 0 | Refills: 0 | DISCHARGE

## 2024-05-16 RX ORDER — ASPIRIN/CALCIUM CARB/MAGNESIUM 324 MG
1 TABLET ORAL
Refills: 0 | DISCHARGE

## 2024-05-16 RX ORDER — ASPIRIN/CALCIUM CARB/MAGNESIUM 324 MG
1 TABLET ORAL
Qty: 30 | Refills: 11
Start: 2024-05-16 | End: 2025-05-10

## 2024-05-16 RX ADMIN — Medication 81 MILLIGRAM(S): at 12:26

## 2024-05-16 RX ADMIN — LOSARTAN POTASSIUM 100 MILLIGRAM(S): 100 TABLET, FILM COATED ORAL at 06:12

## 2024-05-16 RX ADMIN — GABAPENTIN 600 MILLIGRAM(S): 400 CAPSULE ORAL at 12:26

## 2024-05-16 RX ADMIN — AMLODIPINE BESYLATE 10 MILLIGRAM(S): 2.5 TABLET ORAL at 05:15

## 2024-05-16 RX ADMIN — NORTRIPTYLINE HYDROCHLORIDE 50 MILLIGRAM(S): 10 CAPSULE ORAL at 12:26

## 2024-05-16 RX ADMIN — CLOPIDOGREL BISULFATE 75 MILLIGRAM(S): 75 TABLET, FILM COATED ORAL at 12:26

## 2024-05-16 NOTE — DISCHARGE NOTE NURSING/CASE MANAGEMENT/SOCIAL WORK - PATIENT PORTAL LINK FT
You can access the FollowMyHealth Patient Portal offered by [facility] by registering at the following website: [email or web address]. By joining [email or web address]’s FollowMyHealth portal, you will also be able to view your health information using other applications (apps) compatible with our system.

## 2024-05-22 LAB
ISTAT ACTK (ACTIVATED CLOTTING TIME KAOLIN): 287 SEC — HIGH (ref 74–137)
ISTAT ACTK (ACTIVATED CLOTTING TIME KAOLIN): 380 SEC — HIGH (ref 74–137)

## 2024-05-23 DIAGNOSIS — I69.351 HEMIPLEGIA AND HEMIPARESIS FOLLOWING CEREBRAL INFARCTION AFFECTING RIGHT DOMINANT SIDE: ICD-10-CM

## 2024-05-23 DIAGNOSIS — I25.119 ATHEROSCLEROTIC HEART DISEASE OF NATIVE CORONARY ARTERY WITH UNSPECIFIED ANGINA PECTORIS: ICD-10-CM

## 2024-05-23 DIAGNOSIS — E11.51 TYPE 2 DIABETES MELLITUS WITH DIABETIC PERIPHERAL ANGIOPATHY WITHOUT GANGRENE: ICD-10-CM

## 2024-05-23 DIAGNOSIS — I20.9 ANGINA PECTORIS, UNSPECIFIED: ICD-10-CM

## 2024-05-23 DIAGNOSIS — I69.311 MEMORY DEFICIT FOLLOWING CEREBRAL INFARCTION: ICD-10-CM

## 2024-05-23 DIAGNOSIS — Z79.84 LONG TERM (CURRENT) USE OF ORAL HYPOGLYCEMIC DRUGS: ICD-10-CM

## 2024-05-23 DIAGNOSIS — I69.328 OTHER SPEECH AND LANGUAGE DEFICITS FOLLOWING CEREBRAL INFARCTION: ICD-10-CM

## 2024-05-23 DIAGNOSIS — E78.5 HYPERLIPIDEMIA, UNSPECIFIED: ICD-10-CM

## 2024-05-23 DIAGNOSIS — I10 ESSENTIAL (PRIMARY) HYPERTENSION: ICD-10-CM

## 2024-05-23 DIAGNOSIS — F17.210 NICOTINE DEPENDENCE, CIGARETTES, UNCOMPLICATED: ICD-10-CM
